# Patient Record
Sex: FEMALE | Race: WHITE
[De-identification: names, ages, dates, MRNs, and addresses within clinical notes are randomized per-mention and may not be internally consistent; named-entity substitution may affect disease eponyms.]

---

## 2018-02-16 ENCOUNTER — HOSPITAL ENCOUNTER (OUTPATIENT)
Dept: HOSPITAL 53 - M WHC | Age: 63
End: 2018-02-16
Attending: NURSE PRACTITIONER
Payer: COMMERCIAL

## 2018-02-16 ENCOUNTER — HOSPITAL ENCOUNTER (OUTPATIENT)
Dept: HOSPITAL 53 - M SFHCWAGY | Age: 63
End: 2018-02-16
Attending: NURSE PRACTITIONER
Payer: COMMERCIAL

## 2018-02-16 DIAGNOSIS — Z12.31: Primary | ICD-10-CM

## 2018-02-16 DIAGNOSIS — Z01.419: Primary | ICD-10-CM

## 2019-01-15 ENCOUNTER — HOSPITAL ENCOUNTER (OUTPATIENT)
Dept: HOSPITAL 53 - M SFHCCLAY | Age: 64
End: 2019-01-15
Attending: FAMILY MEDICINE
Payer: COMMERCIAL

## 2019-01-15 ENCOUNTER — HOSPITAL ENCOUNTER (OUTPATIENT)
Dept: HOSPITAL 53 - M CLY | Age: 64
End: 2019-01-15
Attending: FAMILY MEDICINE
Payer: COMMERCIAL

## 2019-01-15 DIAGNOSIS — E25.0: ICD-10-CM

## 2019-01-15 DIAGNOSIS — Z01.818: Primary | ICD-10-CM

## 2019-01-15 LAB
BUN SERPL-MCNC: 14 MG/DL (ref 7–18)
CALCIUM SERPL-MCNC: 8.9 MG/DL (ref 8.8–10.2)
CHLORIDE SERPL-SCNC: 101 MEQ/L (ref 98–107)
CO2 SERPL-SCNC: 30 MEQ/L (ref 21–32)
CREAT SERPL-MCNC: 0.66 MG/DL (ref 0.55–1.3)
GFR SERPL CREATININE-BSD FRML MDRD: > 60 ML/MIN/{1.73_M2} (ref 45–?)
GLUCOSE SERPL-MCNC: 82 MG/DL (ref 70–100)
HCT VFR BLD AUTO: 41 % (ref 36–47)
HGB BLD-MCNC: 13 G/DL (ref 12–15.5)
MCH RBC QN AUTO: 27 PG (ref 27–33)
MCHC RBC AUTO-ENTMCNC: 31.7 G/DL (ref 32–36.5)
MCV RBC AUTO: 85.2 FL (ref 80–96)
PLATELET # BLD AUTO: 242 10^3/UL (ref 150–450)
POTASSIUM SERPL-SCNC: 4.2 MEQ/L (ref 3.5–5.1)
RBC # BLD AUTO: 4.81 10^6/UL (ref 4–5.4)
SODIUM SERPL-SCNC: 138 MEQ/L (ref 136–145)
WBC # BLD AUTO: 5.9 10^3/UL (ref 4–10)

## 2019-01-16 NOTE — REP
Clinical:  Preoperative assessment .

 

Comparison: None .

 

Technique:  PA and lateral.

 

Findings:

The mediastinum and cardiac silhouette are normal.  The lung fields are clear and

without acute consolidation, effusion, or pneumothorax.  The skeletal structures

are intact and normal.

 

Impression:

1.   No acute cardiopulmonary process.

## 2019-01-30 ENCOUNTER — HOSPITAL ENCOUNTER (OUTPATIENT)
Dept: HOSPITAL 53 - M SDC | Age: 64
LOS: 1 days | Discharge: HOME | End: 2019-01-31
Attending: PLASTIC SURGERY
Payer: COMMERCIAL

## 2019-01-30 VITALS — SYSTOLIC BLOOD PRESSURE: 112 MMHG | DIASTOLIC BLOOD PRESSURE: 67 MMHG

## 2019-01-30 VITALS — DIASTOLIC BLOOD PRESSURE: 80 MMHG | SYSTOLIC BLOOD PRESSURE: 127 MMHG

## 2019-01-30 VITALS — DIASTOLIC BLOOD PRESSURE: 72 MMHG | SYSTOLIC BLOOD PRESSURE: 123 MMHG

## 2019-01-30 VITALS — DIASTOLIC BLOOD PRESSURE: 84 MMHG | SYSTOLIC BLOOD PRESSURE: 136 MMHG

## 2019-01-30 VITALS — SYSTOLIC BLOOD PRESSURE: 121 MMHG | DIASTOLIC BLOOD PRESSURE: 68 MMHG

## 2019-01-30 VITALS — BODY MASS INDEX: 37.26 KG/M2 | WEIGHT: 161 LBS | HEIGHT: 55 IN

## 2019-01-30 VITALS — DIASTOLIC BLOOD PRESSURE: 66 MMHG | SYSTOLIC BLOOD PRESSURE: 125 MMHG

## 2019-01-30 DIAGNOSIS — Z79.52: ICD-10-CM

## 2019-01-30 DIAGNOSIS — L82.1: ICD-10-CM

## 2019-01-30 DIAGNOSIS — N62: Primary | ICD-10-CM

## 2019-01-30 DIAGNOSIS — B07.8: ICD-10-CM

## 2019-01-30 DIAGNOSIS — M54.5: ICD-10-CM

## 2019-01-30 DIAGNOSIS — E25.0: ICD-10-CM

## 2019-01-30 DIAGNOSIS — G89.29: ICD-10-CM

## 2019-01-30 DIAGNOSIS — A60.00: ICD-10-CM

## 2019-01-30 DIAGNOSIS — Z79.899: ICD-10-CM

## 2019-01-30 PROCEDURE — 88305 TISSUE EXAM BY PATHOLOGIST: CPT

## 2019-01-30 PROCEDURE — 96376 TX/PRO/DX INJ SAME DRUG ADON: CPT

## 2019-01-30 PROCEDURE — 19318 BREAST REDUCTION: CPT

## 2019-01-30 PROCEDURE — 11420 EXC H-F-NK-SP B9+MARG 0.5/<: CPT

## 2019-01-30 PROCEDURE — 11400 EXC TR-EXT B9+MARG 0.5 CM<: CPT

## 2019-01-30 PROCEDURE — 96374 THER/PROPH/DIAG INJ IV PUSH: CPT

## 2019-01-30 RX ADMIN — CEFAZOLIN SODIUM SCH MLS/HR: 1 INJECTION, POWDER, FOR SOLUTION INTRAMUSCULAR; INTRAVENOUS at 17:13

## 2019-01-30 RX ADMIN — SODIUM CHLORIDE, POTASSIUM CHLORIDE, SODIUM LACTATE AND CALCIUM CHLORIDE SCH MLS/HR: 600; 310; 30; 20 INJECTION, SOLUTION INTRAVENOUS at 14:29

## 2019-01-30 NOTE — POST-OPPD
Postoperative Procedure Note


Date Of Procedure:  Jan 30, 2019


PREOPERATIVE DIAGNOSIS: Symptomatic macromastia. Skin lesion right neck, left 

chest





POSTOPERATIVE DIAGNOSIS: same





FINDINGS: Large breasts. Small skin lesion right neck, left chest 





PROCEDURE: Excision lesion right neck, left chest. Bilateral breast reduction. 





SURGEON: Dr Valdez





ASSISTANT: Dr Mart





ANESTHESIA: General





SPECIMENS: Right neck, left chest lesions. Right breast 765gm, Left breast 770 

gm





ESTIMATED BLOOD LOSS: 150cc





REPLACED: none





DRAINS: 10mm NAZANIN drains x 2





COMPLICATIONS: none





POSTOPERATIVE CONDITION: stable











JACEK VALDEZ DO              Jan 30, 2019 12:38

## 2019-01-30 NOTE — RO
DATE OF OPERATION:  01/30/2019

 

PREOPERATIVE DIAGNOSES:  Symptomatic macromastia and skin lesion right neck and

left chest.

 

POSTOPERATIVE DIAGNOSES:  Symptomatic macromastia and skin lesion right neck and

left chest.

 

PROCEDURE:  Excision of lesions right neck and left chest and bilateral breast

reduction.

 

ATTENDING SURGEON:  Felisa Tovar DO

 

ASSISTANT:  Marcus Mart MD

 

ANESTHESIA:  General.

 

SPECIMENS SENT:  Right neck and left chest lesions and right breast 765 grams,

left breast 770 grams tissue.

 

ESTIMATED BLOOD LOSS:  150 mL.

 

There is no replacement needed.

 

Two 10-mm Elroy-Bowen (NAZANIN) drains were left in place.

 

There were no complications.

 

DESCRIPTION OF PROCEDURE:  This is a 63-year-old female who presented to our

office complaining of her severe upper back pain which is not responding to

medical treatment.  She has very large breasts and very heavy breasts.  She wants

to have a breast reduction to eliminate the back pain.  She is a good candidate.

All the risks and benefits and alternatives were discussed with the patient at

length, and she is ready to proceed.  The patient was marked in the upright

position in the preoperative holding area.  Her measurements from the sternal

notch to the nipple areolar complex on the left was 32.5 cm.  On the right, it

was 33.  The new position is going to be at 23 cm.  After the markings were

completed, the patient was brought to the operating room and placed in supine

position.  Preoperative antibiotics are given.  Sequential stockings placed on

the lower calves. General anesthesia was induced.  She was prepped and draped in

the usual sterile fashion.  5000 units of heparin subcutaneous were given before

the case begun.

 

We started our procedure by identifying the two lesions which are pedunculated

skin lesions which on the right neck and the left chest, and both were excised in

elliptical fashion and sent to pathology.  They appear benign, and a single

stitch was used  in the right neck.  A 5-0 plain gut suture was used to repair

the right neck lesion and the left chest lesion, as well.

 

Then we started our breast reduction procedure on the right side.  Nipple

areolar complex was outlined at 42 mm in diameter, and then incision carried out

using 10 blade according to superomedial pedicle fashion.  The inferolateral

portion of the breast was removed using electrocautery.  Hemostasis obtained

using electrocautery, as well.  The pedicle was de-epithelialized Hughes scissors,

and then the whole wound is irrigated with saline-bacitracin irrigation.  Then,

the pedicle was turned superiorly into its new location at 23 cm from the sternal

notch.  The new mound was re-created using 0 Vicryl.  As of note, the patient is

a chronic steroid user so the fat tissue is very fragile.  So, 0 Vicryl sutures

were used to conform and mold the new mound.  The pedicles were then closed with

interrupted 3-0 Monocryl sutures creating the vertical scar.  Nipple areolar

complex was set in place with interrupted 3-0 Monocryl sutures at that point, and

then the excess tissue was marked and resected, creating the horizontal incision,

making the anchor right pattern completed.  A 10-mm Elroy-Bowen drain was

placed through the lateral portion of the horizontal incision, and we continued

closing it with interrupted 3-0 Monocryl sutures and a 3-0 V-Loc Monocryl suture,

as well.

 

Then, we turned our attention to the left side; and again, the nipple areolar

complex was measured at 42 mm in diameter, and then resection started according

to superior, medial, and pedicle fashion.  A resection was done using

electrocautery.  Hemostasis obtained, and the pedicle was de-epithelialized using

Hughes scissors, and then the whole wound is irrigated with bacitracin irrigation

solution.  The pedicle was in good viable condition.  It is turned superiorly and

to its new position, 23 cm from sternal notch.  The mound was re-created using 0

Vicryl sutures, and then the pedicles were then closed with interrupted 3-0

Monocryl sutures creating the vertical scar.  The vertical scar is 10 cm

laterally.  Then, the anchor suture was done on inferior portion, and excess skin

was marked and resected, completing the wide pattern skin envelope.  Nipple

areolar complex was then sutured in place with 3-0 Monocryl sutures deep, 4-0

Monocryl and 5-0 plain gut sutures.  The horizontal incision was closed with

interrupted 3-0 Monocryl sutures and the V-Loc sutures 3-0 Monocryl, as well.  A

10-mm Elroy-Bowen drain was placed through the lateral portion of the incision.

The patient's skin is very thin due to the chronic steroid use and very easily

injured, produces ecchymosis very easily. So, we opted to use Xeroform as a

dressing.  She does have postoperative ecchymosis present on the table along the

vertical scar and along the horizontal incision, but it is not extending, it is

not a hematoma.  Nipple areolar complex is in the perfect condition with good

vasculature and good perfusion.  Support bra was placed with a dressing.

 

The patient is extubated in the operating room without any difficulties and

transferred to the recovery room in stable condition.

## 2019-01-31 VITALS — SYSTOLIC BLOOD PRESSURE: 101 MMHG | DIASTOLIC BLOOD PRESSURE: 61 MMHG

## 2019-01-31 VITALS — SYSTOLIC BLOOD PRESSURE: 101 MMHG | DIASTOLIC BLOOD PRESSURE: 63 MMHG

## 2019-01-31 VITALS — SYSTOLIC BLOOD PRESSURE: 109 MMHG | DIASTOLIC BLOOD PRESSURE: 59 MMHG

## 2019-01-31 RX ADMIN — CEFAZOLIN SODIUM SCH MLS/HR: 1 INJECTION, POWDER, FOR SOLUTION INTRAMUSCULAR; INTRAVENOUS at 01:31

## 2019-01-31 RX ADMIN — SODIUM CHLORIDE, POTASSIUM CHLORIDE, SODIUM LACTATE AND CALCIUM CHLORIDE SCH MLS/HR: 600; 310; 30; 20 INJECTION, SOLUTION INTRAVENOUS at 09:00

## 2019-01-31 NOTE — IPNPDOC
Subjective


General


Date/Time Seen


The patient was seen on 1/31/19 at 08:43.





Subject


Chief Complaint/History


The patient is a 63-year-old female admitted with a reason for visit of 

Bilateral Breast Hypertrophy. S/p BBR POD 1. Comfortable. No issues overnight. 

Tolerating foot, drinking, ambulating to the bathroom.





Current Medications


Current Medications





Current Medications


Cefazolin Sodium 1 gm/Dextrose 50 ml @  100 mls/hr Q8H IV  Last administered on 

1/31/19at 01:31;  Start 1/30/19 at 17:00;  Stop 1/31/19 at 01:29;  Status DC


Dexamethasone (Decadron) 0.5 mg DAILY PO  Last administered on 1/30/19at 18:00; 

Start 1/30/19 at 18:00


Fentanyl Citrate (Sublimaze) 25 mcg Q5MP  PRN IV MODERATE PAIN (PS 4-7) Last 

administered on 1/30/19at 13:00;  Start 1/30/19 at 13:15;  Stop 1/30/19 at 

14:15;  Status DC


Lactated Ringer's 1,000 ml @  50 mls/hr Q20H IV  Last administered on 1/30/19at 

14:29;  Start 1/30/19 at 13:00


Lactated Ringer's 1,000 ml @  100 mls/hr Q10H IV ;  Start 1/30/19 at 13:15;  

Stop 1/30/19 at 14:15;  Status DC


Metoclopramide HCl (REGLAN INJection) 10 mg Q6HP  PRN IV NAUSEA OR VOMITING;  

Start 1/30/19 at 13:15;  Stop 1/30/19 at 14:15;  Status DC


Miscellaneous (Unresolved Clarification Entry) SEE LABEL COMMENTS DAILY XX ;  

Start 1/30/19 at 09:00;  Stop 1/30/19 at 13:07;  Status DC


Morphine Sulfate (Morphine Sulfate Inj) 4 mg Q4H  PRN IV SEVERE PAIN (PS 8-10); 

Start 1/30/19 at 13:15


Ondansetron HCl (ZOFRAN INJection) 4 mg Q4HP  PRN IV NAUSEA OR VOMITING;  Start 

1/30/19 at 13:15;  Stop 1/30/19 at 14:15;  Status DC


Oxycodone/ Acetaminophen (Percocet 5mg/ 325mg Tablet) 1 tab ASDIRECTED  PRN PO 

MILD/MODERATE PAIN (PS 1-7) Last administered on 1/30/19at 13:12;  Start 1/30/19

at 13:15;  Stop 1/30/19 at 14:15;  Status DC


Oxycodone/ Acetaminophen (Percocet 5mg/ 325mg Tablet) 2 tab Q4H  PRN PO PAIN 

Last administered on 1/31/19at 08:25;  Start 1/30/19 at 13:15





Allergies


Coded Allergies:  


     No Known Allergies (Unverified , 1/16/19)





Objective


Physical Examination


Examination


GENERAL APPEARANCE:Patient seen, laying in bed, awake, alert, and oriented. 

Comfortable, in no acute distress. Pain controlled with Percocet


SKIN: Warm and moist. Breast: Incisions clean, dry. Post op ecchymosis not 

expanding. No acute bleeding. NAC viable, pink, warm. NAZANIN drains with 

serosanguineous drainage. 


HEENT: Normocephalic,  atraumatic. Pink palpebral conjunctiva, anicteric 

sclerae. Lips and mucosa appear moist.


NECK: Supple, no thyromegaly. No obvious jugular venous distention.


LUNGS: Clear to auscultation bilaterally. No wheezing appreciated.


HEART: No chest wall abnormalities. Regular rate and rhythm with no murmurs 

appreciated.


Vital Signs





Vital Signs








  Date Time  Temp Pulse Resp B/P (MAP) Pulse Ox O2 Delivery O2 Flow Rate FiO2


 


1/31/19 08:25   20     


 


1/31/19 06:00 98.9 70  101/61 (74) 98   


 


1/30/19 13:44      Nasal Cannula 2 








I&Os











I&O- Last 24 Hours up to 6 AM 


 


 1/31/19





 06:00


 


Intake Total 2800 ml


 


Output Total 215 ml


 


Balance 2585 ml











Impression


Symptomatic macromastia. S/p BBR.


Stable for discharge


Pain controlled.


Dressings changed.


Monitor NAZANIN output at home


Pain control


F/up Plastic surgery Monday, Feb 4


Instructions given.





Plan / VTE


VTE Prophylaxis Ordered?:  Yes











JACEK VALDEZ DO              Jan 31, 2019 08:47

## 2019-04-24 ENCOUNTER — HOSPITAL ENCOUNTER (OUTPATIENT)
Dept: HOSPITAL 53 - M LAB REF | Age: 64
End: 2019-04-24
Attending: PLASTIC SURGERY
Payer: COMMERCIAL

## 2019-04-24 DIAGNOSIS — D22.5: Primary | ICD-10-CM

## 2020-02-04 ENCOUNTER — HOSPITAL ENCOUNTER (OUTPATIENT)
Dept: HOSPITAL 53 - M WHC | Age: 65
End: 2020-02-04
Attending: INTERNAL MEDICINE
Payer: COMMERCIAL

## 2020-02-04 ENCOUNTER — HOSPITAL ENCOUNTER (OUTPATIENT)
Dept: HOSPITAL 53 - M WHC | Age: 65
End: 2020-02-04
Attending: NURSE PRACTITIONER
Payer: COMMERCIAL

## 2020-02-04 ENCOUNTER — HOSPITAL ENCOUNTER (OUTPATIENT)
Dept: HOSPITAL 53 - M SFHCWAGY | Age: 65
End: 2020-02-04
Attending: NURSE PRACTITIONER
Payer: COMMERCIAL

## 2020-02-04 DIAGNOSIS — N63.10: ICD-10-CM

## 2020-02-04 DIAGNOSIS — Z12.31: Primary | ICD-10-CM

## 2020-02-04 DIAGNOSIS — Z12.4: Primary | ICD-10-CM

## 2020-02-04 DIAGNOSIS — M85.80: Primary | ICD-10-CM

## 2020-02-04 NOTE — REPMRS
Patient History

The patient states she had a clinical breast exam in Feb. 2020.

 

Family history of breast cancer at age 50 or over in mother.

Bilateral breast reduction 1/30/2019.

 

Digital Woman Screen Mammo: February 4, 2020 - Exam #: 

[id number]

Bilateral CC and MLO view(s) were taken.

 

Technologist: Vicky Campbell, Technologist

Prior study comparison: February 16, 2018, digital woman screen 

mammo performed at [facility].  

February 26, 2016, digital woman screen mammo performed at 

[facility].

 

FINDINGS: There are scattered fibroglandular densities.  The 

patient status post bilateral breast reduction surgery in the 

interval since the February 16 2018 prior mammography.

There is a 5 mm nodular neodensity superficially in the right 

lateral breast superiorly.  On mammographic and tomographic 

images, this has a lucent center consistent with a small nodule 

of fat necrosis.  This is felt to have a benign appearance. There

has been no change in the appearance of the mammogram from the 

prior studies.  There is a mild amount of scattered 

fibroglandular density which is fairly symmetric. There is no 

interval development of dominant mass, architectural distortion, 

or grouped microcalcification suggestive of malignancy.

3-D tomosynthesis shows no additional findings.

 

Assessment: BI-RADS/ACR category 2 mammogram. Benign Findings.

 

Recommendation

Breast MRI of both breasts in 6 months.

Routine screening mammogram of both breasts in 1 year (for women 

over age 40).

This patient's Lifetime Breast Cancer Risk is estimated at 22.3 

%.

Annual screening Breast MRI scanniing is recommended for 

patient's whose lifetime risk assessment is over 20%.

This mammogram was interpreted with the aid of an FDA-approved 

computer-aided dectection system.

 

Electronically Signed By: Jorge Fontanez MD 02/04/20 [id number]

## 2020-02-12 NOTE — DEXA
AP SPINE   L1 - L4   1.199    0.0      1.5

LT FEMUR   TOTAL   0.984   -0.2      0.9

LT NECK      0.879   -1.1      0.2

RT FEMUR   TOTAL   1.010    0.0      1.1      

RT NECK      0.942   -0.7      0.7

TOTAL BODY   TOTAL

OTHER



COMMENTS:

Normal bone densitometry of the spine.

Normal bone densitometry of the right hip.

There is low bone density of the left hip.

The density of the spine has decreased 5.6% since 01/27/2012.

The density of the left hip has decreased 2.8% since 01/27/2012.

The density of the right hip has decreased 2.0% since 01/27/2012.



FOLLOW-UP:

Recommendation for the next bone density exam: 2 years.



KANDIS